# Patient Record
Sex: MALE | Race: WHITE | NOT HISPANIC OR LATINO | Employment: OTHER | URBAN - NONMETROPOLITAN AREA
[De-identification: names, ages, dates, MRNs, and addresses within clinical notes are randomized per-mention and may not be internally consistent; named-entity substitution may affect disease eponyms.]

---

## 2023-07-17 PROBLEM — K80.20 GALLSTONES: Status: ACTIVE | Noted: 2023-07-17

## 2023-07-26 ENCOUNTER — TELEPHONE (OUTPATIENT)
Dept: SURGERY | Facility: CLINIC | Age: 73
End: 2023-07-26
Payer: MEDICARE

## 2023-07-26 NOTE — TELEPHONE ENCOUNTER
Phone call rec'd from Cyn @ [facility] stating pt has a state guardian and can not have any organs removed from his body without a court order.  Cyn is working with state guardian and will contact this office when gary order is obtained from a .    SG SCHED notified.

## 2024-01-19 ENCOUNTER — TELEPHONE (OUTPATIENT)
Dept: SURGERY | Facility: CLINIC | Age: 74
End: 2024-01-19
Payer: MEDICARE

## 2024-01-19 NOTE — TELEPHONE ENCOUNTER
Spoke with Cyn [facility] and she reports pt has OV sched 01/22/24 to discuss Lap [facility].  Pt needs new letter with new SG date to be presented before the .  Letter will be provided.

## 2024-01-22 ENCOUNTER — OFFICE VISIT (OUTPATIENT)
Dept: SURGERY | Facility: CLINIC | Age: 74
End: 2024-01-22
Payer: MEDICARE

## 2024-01-22 VITALS
BODY MASS INDEX: 27 KG/M2 | HEIGHT: 67 IN | DIASTOLIC BLOOD PRESSURE: 80 MMHG | HEART RATE: 72 BPM | WEIGHT: 172 LBS | RESPIRATION RATE: 20 BRPM | SYSTOLIC BLOOD PRESSURE: 122 MMHG

## 2024-01-22 DIAGNOSIS — K80.20 GALLSTONES: Primary | ICD-10-CM

## 2024-01-22 PROCEDURE — 99213 OFFICE O/P EST LOW 20 MIN: CPT | Performed by: SURGERY

## 2024-01-22 PROCEDURE — 1160F RVW MEDS BY RX/DR IN RCRD: CPT | Performed by: SURGERY

## 2024-01-22 PROCEDURE — 1159F MED LIST DOCD IN RCRD: CPT | Performed by: SURGERY

## 2024-01-22 NOTE — PROGRESS NOTES
Omkar Harding 73 y.o. male presents to update H&P for Lap Vir[facility]iana.    Chief Complaint   Patient presents with    Cholelithiasis             HPI   Above-noted agree.  Omkar is here to update his H&P for his cholecystectomy.  On discussion with Omkar he has not had any abdominal pain.  He tolerates a regular diet without nausea or vomiting.  He denies bloating.  He says he gets occasional heartburn but that is due to what he eats.  At this time he says he does not want to have his gallbladder out.  We did review his chart which had a CT scan done in 2022 that showed gallstones as well as ultrasound done 6 months ago that showed gallstones.  At that time he did have abdominal pain but now he does not.      Review of Systems   All other systems reviewed and are negative.          No current outpatient medications on file.        No Known Allergies        Past Medical History:   Diagnosis Date    Alcohol-induced persisting dementia     CAD (coronary artery disease)     Chronic back pain     GERD (gastroesophageal reflux disease)     Insomnia     Major depressive disorder     Osteoarthritis     Venous thromboembolism (VTE)            No past surgical history on file.        Social History     Tobacco Use    Smoking status: Every Day     Types: Cigarettes    Smokeless tobacco: Never   Substance Use Topics    Alcohol use: Yes             There is no immunization history on file for this patient.        Physical Exam  Vitals and nursing note reviewed.   Constitutional:       Appearance: Normal appearance.   HENT:      Head: Normocephalic and atraumatic.   Cardiovascular:      Rate and Rhythm: Normal rate and regular rhythm.   Pulmonary:      Effort: Pulmonary effort is normal.      Breath sounds: Normal breath sounds.   Abdominal:      General: Bowel sounds are normal.      Palpations: Abdomen is soft.   Musculoskeletal:         General: No swelling or tenderness.   Skin:     General: Skin is warm and dry.   Neurological:  "     General: No focal deficit present.      Mental Status: He is alert and oriented to person, place, and time.   Psychiatric:         Mood and Affect: Mood normal.         Behavior: Behavior normal.         Debilities/Disabilities Identified: None    Emotional Behavior: Appropriate      /80   Pulse 72   Resp 20   Ht 170.2 cm (67\")   Wt 78 kg (172 lb)   BMI 26.94 kg/m²         Diagnoses and all orders for this visit:    1. Gallstones (Primary)    After discussing with Omkar the benefits and risks and surgical procedure, he decided he did not want to have his gallbladder out at this time.  I discussed with him if he should develop symptoms or have any issues he may call and return anytime.    Thank you for allowing me to participate in the care of this interesting patient.         "

## 2024-01-22 NOTE — LETTER
January 22, 2024       No Recipients    Patient: Omkar Harding   YOB: 1950   Date of Visit: 1/22/2024     Dear EDMUNDO Cornejo:       Thank you for referring Omkar Harding to me for evaluation. Below are the relevant portions of my assessment and plan of care.    If you have questions, please do not hesitate to call me. I look forward to following Omkar along with you.         Sincerely,        Flor Almonte DO        CC:   No Recipients    Flor Almonte DO  01/22/24 0935  Sign when Signing Visit  Omkar Harding 73 y.o. male presents to update H&P for Lap Viridiana.    Chief Complaint   Patient presents with   • Cholelithiasis             HPI   Above-noted agree.  Omkar is here to update his H&P for his cholecystectomy.  On discussion with Omkar he has not had any abdominal pain.  He tolerates a regular diet without nausea or vomiting.  He denies bloating.  He says he gets occasional heartburn but that is due to what he eats.  At this time he says he does not want to have his gallbladder out.  We did review his chart which had a CT scan done in 2022 that showed gallstones as well as ultrasound done 6 months ago that showed gallstones.  At that time he did have abdominal pain but now he does not.      Review of Systems   All other systems reviewed and are negative.          No current outpatient medications on file.        No Known Allergies        Past Medical History:   Diagnosis Date   • Alcohol-induced persisting dementia    • CAD (coronary artery disease)    • Chronic back pain    • GERD (gastroesophageal reflux disease)    • Insomnia    • Major depressive disorder    • Osteoarthritis    • Venous thromboembolism (VTE)            No past surgical history on file.        Social History     Tobacco Use   • Smoking status: Every Day     Types: Cigarettes   • Smokeless tobacco: Never   Substance Use Topics   • Alcohol use: Yes             There is no immunization history on file  "for this patient.        Physical Exam  Vitals and nursing note reviewed.   Constitutional:       Appearance: Normal appearance.   HENT:      Head: Normocephalic and atraumatic.   Cardiovascular:      Rate and Rhythm: Normal rate and regular rhythm.   Pulmonary:      Effort: Pulmonary effort is normal.      Breath sounds: Normal breath sounds.   Abdominal:      General: Bowel sounds are normal.      Palpations: Abdomen is soft.   Musculoskeletal:         General: No swelling or tenderness.   Skin:     General: Skin is warm and dry.   Neurological:      General: No focal deficit present.      Mental Status: He is alert and oriented to person, place, and time.   Psychiatric:         Mood and Affect: Mood normal.         Behavior: Behavior normal.         Debilities/Disabilities Identified: None    Emotional Behavior: Appropriate      /80   Pulse 72   Resp 20   Ht 170.2 cm (67\")   Wt 78 kg (172 lb)   BMI 26.94 kg/m²         Diagnoses and all orders for this visit:    1. Gallstones (Primary)    After discussing with Omkar the benefits and risks and surgical procedure, he decided he did not want to have his gallbladder out at this time.  I discussed with him if he should develop symptoms or have any issues he may call and return anytime.    Thank you for allowing me to participate in the care of this interesting patient.         "